# Patient Record
(demographics unavailable — no encounter records)

---

## 2024-11-24 NOTE — DISCUSSION/SUMMARY
[EKG obtained to assist in diagnosis and management of assessed problem(s)] : EKG obtained to assist in diagnosis and management of assessed problem(s) [FreeTextEntry1] : The patient has insulin-dependent diabetes since age 7.  He has atypical/nonanginal chest discomfort.  His blood pressure is elevated here and at home.  EKG is normal.  The echocardiogram shows an ejection fraction of 60% without significant valvular disease.  Patient will return for stress test to evaluate him for coronary disease.  He will get a calcium score for prognosis. Laboratory testing was done and reviewed.  The LDL is above goal.  Recommend atorvastatin 10 mg daily.  Recommend losartan 50 mg daily for blood pressure and low potassium diet.

## 2024-11-24 NOTE — HISTORY OF PRESENT ILLNESS
[FreeTextEntry1] : The patient is felt chest tightness which occurs on and off all day.  He has also had a positional prickly feeling in his chest.  Patient is runs jogs and cycles without difficulty.  He has infrequent dizziness.  He has had insulin-dependent diabetes since age 7.  He has eosinophilic esophagitis and celiac disease.  He is on an insulin pump and his sugar has been good.

## 2024-12-05 NOTE — PHYSICAL EXAM
[Well Developed] : well developed [Well Nourished] : well nourished [No Acute Distress] : no acute distress [No Carotid Bruit] : no carotid bruit [Normal S1, S2] : normal S1, S2 [Clear Lung Fields] : clear lung fields [Good Air Entry] : good air entry [No Respiratory Distress] : no respiratory distress  [Normal Gait] : normal gait [No Edema] : no edema [Normal Radial B/L] : normal radial B/L [Normal PT B/L] : normal PT B/L [Moves all extremities] : moves all extremities [No Focal Deficits] : no focal deficits [Normal Speech] : normal speech [Alert and Oriented] : alert and oriented [Normal memory] : normal memory

## 2024-12-06 NOTE — DISCUSSION/SUMMARY
[FreeTextEntry1] : 24 year old male with PMHX of HTN and DM here for a stress test.  Chest Pain: Non exertional chest tightness. No return. No ischemic findings on stress test. Significant CAD not likely. Will continue with risk factor optimization.   HLD: LDL goal < 100 with last LDL at 108. Continue with Atorvastatin 10mg.   Follow up in 6 months.  I have discussed the case with PATRICK Blake. I have personally performed a history, physical exam, and my own medical decision making. I have reviewed the note and agree with the findings and plan.   The patient has atypical chest pain with multiple risk factors for coronary disease including juvenile onset diabetes.  The stress EKG is normal.  Significant coronary artery disease is unlikely.  His blood pressure is elevated but he did not take losartan today.  He will check his blood pressure at home on medication.

## 2024-12-06 NOTE — HISTORY OF PRESENT ILLNESS
[FreeTextEntry1] : 24 year old male with PMHX of HTN and DM here for a stress test.  Since last visit, overall feeling well. No return of chest tightness or palpitations. No return of dizziness. He has no syncope or neuro focal deficits.   He remains active by walking vigorously daily. he reports no complications.   He started his Atorvastatin without complications.    He denies any claudication type pains.   Previous Work Up Labs ( 11/25/2024 ) , HDL 48, , A1c 6.0  ECHO ( 11/22/2024 )  1. Left ventricular cavity is normal in size. Left ventricular wall thickness is normal. Left ventricular systolic function is normal with an ejection fraction of 64 % by Bonner's method of disks. There are no regional wall motion abnormalities seen. 2. Normal left ventricular diastolic function. 3. Normal right ventricular cavity size, with normal wall thickness, and normal right ventricular systolic function. 4. No significant valvular disease. No mitral valve prolapse. 5. No pericardial effusion seen.

## 2024-12-06 NOTE — REVIEW OF SYSTEMS
[Fever] : no fever [Chills] : no chills [Discharge From Ears] : no discharge from the ears [Sinus Pressure] : no sinus pressure [Dyspnea on exertion] : not dyspnea during exertion [Chest Discomfort] : no chest discomfort [Lower Ext Edema] : no extremity edema [Palpitations] : no palpitations [PND] : no PND [Nausea] : no nausea [Vomiting] : no vomiting [Heartburn] : no heartburn [Diarrhea] : diarrhea [Dizziness] : no dizziness [Numbness (Hypoesthesia)] : no numbness [Weakness] : no weakness [Speech Disturbance] : no speech disturbance [Easy Bleeding] : no tendency for easy bleeding